# Patient Record
Sex: FEMALE | Race: ASIAN | NOT HISPANIC OR LATINO | Employment: FULL TIME | URBAN - METROPOLITAN AREA
[De-identification: names, ages, dates, MRNs, and addresses within clinical notes are randomized per-mention and may not be internally consistent; named-entity substitution may affect disease eponyms.]

---

## 2024-06-26 LAB
ABO (EXTERNAL): NORMAL
HEPATITIS B SURFACE ANTIGEN (EXTERNAL): NONREACTIVE
HEPATITIS C ANTIBODY (EXTERNAL): NONREACTIVE
HIV1+2 AB SERPL QL IA: NONREACTIVE
RH (EXTERNAL): POSITIVE
RUBELLA ANTIBODY IGG (EXTERNAL): NORMAL
TREPONEMA PALLIDUM ANTIBODY (EXTERNAL): NONREACTIVE

## 2024-08-23 LAB — GROUP B STREPTOCOCCUS (EXTERNAL): POSITIVE

## 2024-09-06 ENCOUNTER — TRANSFERRED RECORDS (OUTPATIENT)
Dept: HEALTH INFORMATION MANAGEMENT | Facility: CLINIC | Age: 39
End: 2024-09-06

## 2024-09-24 ENCOUNTER — HOSPITAL ENCOUNTER (INPATIENT)
Facility: HOSPITAL | Age: 39
LOS: 2 days | Discharge: HOME | End: 2024-09-26
Attending: ADVANCED PRACTICE MIDWIFE
Payer: COMMERCIAL

## 2024-09-24 PROBLEM — O47.9 UTERINE CONTRACTIONS: Status: ACTIVE | Noted: 2024-09-24

## 2024-09-24 PROBLEM — O99.820 GROUP B STREPTOCOCCUS CARRIER, ANTEPARTUM: Status: ACTIVE | Noted: 2024-09-24

## 2024-09-24 PROBLEM — Z36.89 ENCOUNTER FOR TRIAGE IN PREGNANT PATIENT: Status: ACTIVE | Noted: 2024-09-24

## 2024-09-24 LAB
ABO/RH(D): NORMAL
ANTIBODY SCREEN: NEGATIVE
HGB BLD-MCNC: 12 G/DL (ref 11.7–15.7)
HOLD SPECIMEN: NORMAL
SPECIMEN EXPIRATION DATE: NORMAL

## 2024-09-24 PROCEDURE — 36415 COLL VENOUS BLD VENIPUNCTURE: CPT

## 2024-09-24 PROCEDURE — 120N000001 HC R&B MED SURG/OB

## 2024-09-24 PROCEDURE — 85018 HEMOGLOBIN: CPT

## 2024-09-24 PROCEDURE — 86780 TREPONEMA PALLIDUM: CPT

## 2024-09-24 PROCEDURE — 250N000011 HC RX IP 250 OP 636

## 2024-09-24 PROCEDURE — 86900 BLOOD TYPING SEROLOGIC ABO: CPT

## 2024-09-24 RX ORDER — LIDOCAINE 40 MG/G
CREAM TOPICAL
Status: DISCONTINUED | OUTPATIENT
Start: 2024-09-24 | End: 2024-09-24 | Stop reason: HOSPADM

## 2024-09-24 RX ORDER — METOCLOPRAMIDE HYDROCHLORIDE 5 MG/ML
10 INJECTION INTRAMUSCULAR; INTRAVENOUS EVERY 6 HOURS PRN
Status: DISCONTINUED | OUTPATIENT
Start: 2024-09-24 | End: 2024-09-25 | Stop reason: HOSPADM

## 2024-09-24 RX ORDER — CITRIC ACID/SODIUM CITRATE 334-500MG
30 SOLUTION, ORAL ORAL
Status: DISCONTINUED | OUTPATIENT
Start: 2024-09-24 | End: 2024-09-25 | Stop reason: HOSPADM

## 2024-09-24 RX ORDER — OXYTOCIN 10 [USP'U]/ML
10 INJECTION, SOLUTION INTRAMUSCULAR; INTRAVENOUS
Status: DISCONTINUED | OUTPATIENT
Start: 2024-09-24 | End: 2024-09-25 | Stop reason: HOSPADM

## 2024-09-24 RX ORDER — KETOROLAC TROMETHAMINE 30 MG/ML
30 INJECTION, SOLUTION INTRAMUSCULAR; INTRAVENOUS
Status: COMPLETED | OUTPATIENT
Start: 2024-09-24 | End: 2024-09-25

## 2024-09-24 RX ORDER — METOCLOPRAMIDE 10 MG/1
10 TABLET ORAL EVERY 6 HOURS PRN
Status: DISCONTINUED | OUTPATIENT
Start: 2024-09-24 | End: 2024-09-25 | Stop reason: HOSPADM

## 2024-09-24 RX ORDER — OXYCODONE HYDROCHLORIDE 5 MG/1
5 TABLET ORAL
Status: DISCONTINUED | OUTPATIENT
Start: 2024-09-24 | End: 2024-09-26 | Stop reason: HOSPADM

## 2024-09-24 RX ORDER — NALOXONE HYDROCHLORIDE 0.4 MG/ML
0.2 INJECTION, SOLUTION INTRAMUSCULAR; INTRAVENOUS; SUBCUTANEOUS
Status: DISCONTINUED | OUTPATIENT
Start: 2024-09-24 | End: 2024-09-25 | Stop reason: HOSPADM

## 2024-09-24 RX ORDER — ACETAMINOPHEN 325 MG/1
650 TABLET ORAL EVERY 4 HOURS PRN
Status: DISCONTINUED | OUTPATIENT
Start: 2024-09-24 | End: 2024-09-25 | Stop reason: HOSPADM

## 2024-09-24 RX ORDER — HYDROXYZINE HYDROCHLORIDE 50 MG/1
50-100 TABLET, FILM COATED ORAL
Status: DISCONTINUED | OUTPATIENT
Start: 2024-09-24 | End: 2024-09-26 | Stop reason: HOSPADM

## 2024-09-24 RX ORDER — OXYTOCIN/0.9 % SODIUM CHLORIDE 30/500 ML
100-340 PLASTIC BAG, INJECTION (ML) INTRAVENOUS CONTINUOUS PRN
Status: DISCONTINUED | OUTPATIENT
Start: 2024-09-24 | End: 2024-09-26 | Stop reason: HOSPADM

## 2024-09-24 RX ORDER — LOPERAMIDE HCL 2 MG
2 CAPSULE ORAL
Status: DISCONTINUED | OUTPATIENT
Start: 2024-09-24 | End: 2024-09-25 | Stop reason: HOSPADM

## 2024-09-24 RX ORDER — MISOPROSTOL 200 UG/1
800 TABLET ORAL
Status: DISCONTINUED | OUTPATIENT
Start: 2024-09-24 | End: 2024-09-25 | Stop reason: HOSPADM

## 2024-09-24 RX ORDER — TRANEXAMIC ACID 10 MG/ML
1 INJECTION, SOLUTION INTRAVENOUS EVERY 30 MIN PRN
Status: DISCONTINUED | OUTPATIENT
Start: 2024-09-24 | End: 2024-09-25 | Stop reason: HOSPADM

## 2024-09-24 RX ORDER — MORPHINE SULFATE 10 MG/ML
10 INJECTION, SOLUTION INTRAMUSCULAR; INTRAVENOUS
Status: DISCONTINUED | OUTPATIENT
Start: 2024-09-24 | End: 2024-09-26 | Stop reason: HOSPADM

## 2024-09-24 RX ORDER — CALCIUM CARBONATE 500 MG/1
1000 TABLET, CHEWABLE ORAL 3 TIMES DAILY PRN
Status: DISCONTINUED | OUTPATIENT
Start: 2024-09-24 | End: 2024-09-25 | Stop reason: HOSPADM

## 2024-09-24 RX ORDER — ONDANSETRON 4 MG/1
4 TABLET, ORALLY DISINTEGRATING ORAL EVERY 6 HOURS PRN
Status: DISCONTINUED | OUTPATIENT
Start: 2024-09-24 | End: 2024-09-25 | Stop reason: HOSPADM

## 2024-09-24 RX ORDER — PENICILLIN G POTASSIUM 5000000 [IU]/1
5 INJECTION, POWDER, FOR SOLUTION INTRAMUSCULAR; INTRAVENOUS ONCE
Status: COMPLETED | OUTPATIENT
Start: 2024-09-24 | End: 2024-09-24

## 2024-09-24 RX ORDER — LOPERAMIDE HCL 2 MG
4 CAPSULE ORAL
Status: DISCONTINUED | OUTPATIENT
Start: 2024-09-24 | End: 2024-09-25 | Stop reason: HOSPADM

## 2024-09-24 RX ORDER — OXYTOCIN 10 [USP'U]/ML
10 INJECTION, SOLUTION INTRAMUSCULAR; INTRAVENOUS
Status: COMPLETED | OUTPATIENT
Start: 2024-09-24 | End: 2024-09-25

## 2024-09-24 RX ORDER — ONDANSETRON 2 MG/ML
4 INJECTION INTRAMUSCULAR; INTRAVENOUS EVERY 6 HOURS PRN
Status: DISCONTINUED | OUTPATIENT
Start: 2024-09-24 | End: 2024-09-25 | Stop reason: HOSPADM

## 2024-09-24 RX ORDER — MISOPROSTOL 200 UG/1
400 TABLET ORAL
Status: DISCONTINUED | OUTPATIENT
Start: 2024-09-24 | End: 2024-09-25 | Stop reason: HOSPADM

## 2024-09-24 RX ORDER — FERROUS SULFATE 325(65) MG
325 TABLET ORAL
COMMUNITY

## 2024-09-24 RX ORDER — CARBOPROST TROMETHAMINE 250 UG/ML
250 INJECTION, SOLUTION INTRAMUSCULAR
Status: DISCONTINUED | OUTPATIENT
Start: 2024-09-24 | End: 2024-09-25 | Stop reason: HOSPADM

## 2024-09-24 RX ORDER — METHYLERGONOVINE MALEATE 0.2 MG/ML
200 INJECTION INTRAVENOUS
Status: DISCONTINUED | OUTPATIENT
Start: 2024-09-24 | End: 2024-09-25 | Stop reason: HOSPADM

## 2024-09-24 RX ORDER — VITAMIN A, VITAMIN C, VITAMIN D-3, VITAMIN E, VITAMIN B-1, VITAMIN B-2, NIACIN, VITAMIN B-6, CALCIUM, IRON, ZINC, COPPER 4000; 120; 400; 22; 1.84; 3; 20; 10; 1; 12; 200; 27; 25; 2 [IU]/1; MG/1; [IU]/1; MG/1; MG/1; MG/1; MG/1; MG/1; MG/1; UG/1; MG/1; MG/1; MG/1; MG/1
1 TABLET ORAL DAILY
COMMUNITY

## 2024-09-24 RX ORDER — FENTANYL CITRATE 50 UG/ML
100 INJECTION, SOLUTION INTRAMUSCULAR; INTRAVENOUS
Status: DISCONTINUED | OUTPATIENT
Start: 2024-09-24 | End: 2024-09-25 | Stop reason: HOSPADM

## 2024-09-24 RX ORDER — NALOXONE HYDROCHLORIDE 0.4 MG/ML
0.4 INJECTION, SOLUTION INTRAMUSCULAR; INTRAVENOUS; SUBCUTANEOUS
Status: DISCONTINUED | OUTPATIENT
Start: 2024-09-24 | End: 2024-09-25 | Stop reason: HOSPADM

## 2024-09-24 RX ORDER — IBUPROFEN 800 MG/1
800 TABLET, FILM COATED ORAL
Status: COMPLETED | OUTPATIENT
Start: 2024-09-24 | End: 2024-09-25

## 2024-09-24 RX ORDER — SODIUM CHLORIDE, SODIUM LACTATE, POTASSIUM CHLORIDE, CALCIUM CHLORIDE 600; 310; 30; 20 MG/100ML; MG/100ML; MG/100ML; MG/100ML
INJECTION, SOLUTION INTRAVENOUS CONTINUOUS PRN
Status: DISCONTINUED | OUTPATIENT
Start: 2024-09-24 | End: 2024-09-25 | Stop reason: HOSPADM

## 2024-09-24 RX ORDER — PROCHLORPERAZINE MALEATE 10 MG
10 TABLET ORAL EVERY 6 HOURS PRN
Status: DISCONTINUED | OUTPATIENT
Start: 2024-09-24 | End: 2024-09-25 | Stop reason: HOSPADM

## 2024-09-24 RX ORDER — PENICILLIN G 3000000 [IU]/50ML
3 INJECTION, SOLUTION INTRAVENOUS EVERY 4 HOURS
Status: DISCONTINUED | OUTPATIENT
Start: 2024-09-24 | End: 2024-09-25 | Stop reason: HOSPADM

## 2024-09-24 RX ORDER — OXYTOCIN/0.9 % SODIUM CHLORIDE 30/500 ML
340 PLASTIC BAG, INJECTION (ML) INTRAVENOUS CONTINUOUS PRN
Status: DISCONTINUED | OUTPATIENT
Start: 2024-09-24 | End: 2024-09-25 | Stop reason: HOSPADM

## 2024-09-24 RX ADMIN — PENICILLIN G POTASSIUM 5 MILLION UNITS: 5000000 POWDER, FOR SOLUTION INTRAMUSCULAR; INTRAPLEURAL; INTRATHECAL; INTRAVENOUS at 20:01

## 2024-09-24 ASSESSMENT — ACTIVITIES OF DAILY LIVING (ADL)
ADLS_ACUITY_SCORE: 18
ADLS_ACUITY_SCORE: 35

## 2024-09-24 NOTE — PROGRESS NOTES
Pt sitting up to regular meal. CNM orders that Efm may be removed.  Plan of care explained to pt and spouse.   Karen J Schoenberg, RN

## 2024-09-24 NOTE — PROGRESS NOTES
Pt states contractions becoming more regular since vaginal exam by RN. Pt orders meal. Plan to ambulate after meal and initate Saline lock and IV antibiotics if labor pattern continues.     CNM on [facility] updated of pt current contractions pattern.     Pt is cheerful and talks through currently mild palpable contractions.   Karen J Schoenberg, RN

## 2024-09-24 NOTE — PROGRESS NOTES
" presents with statements that she has had occasional contractions today and \"I hope today is the day\". Pt denies bloody show or leakage of any fluid, and states she didn't time any contractions because \"I was too busy with my 2 yr old.\".    Pt is accompanies by FOB.     Pt denies any health issues in pregnancy or otherwise.     Placed on EFM and PO fluids given. Triage plan of care explained.     Abdomen palpates soft. No contractions noted when RN in room yet.     Karen J Schoenberg, RN      "

## 2024-09-25 LAB — T PALLIDUM AB SER QL: NONREACTIVE

## 2024-09-25 PROCEDURE — 250N000013 HC RX MED GY IP 250 OP 250 PS 637: Performed by: REGISTERED NURSE

## 2024-09-25 PROCEDURE — 722N000001 HC LABOR CARE VAGINAL DELIVERY SINGLE

## 2024-09-25 PROCEDURE — 250N000013 HC RX MED GY IP 250 OP 250 PS 637

## 2024-09-25 PROCEDURE — 10907ZC DRAINAGE OF AMNIOTIC FLUID, THERAPEUTIC FROM PRODUCTS OF CONCEPTION, VIA NATURAL OR ARTIFICIAL OPENING: ICD-10-PCS | Performed by: REGISTERED NURSE

## 2024-09-25 PROCEDURE — 0HQ9XZZ REPAIR PERINEUM SKIN, EXTERNAL APPROACH: ICD-10-PCS | Performed by: REGISTERED NURSE

## 2024-09-25 PROCEDURE — 250N000009 HC RX 250

## 2024-09-25 PROCEDURE — 120N000001 HC R&B MED SURG/OB

## 2024-09-25 PROCEDURE — 250N000011 HC RX IP 250 OP 636

## 2024-09-25 RX ORDER — LOPERAMIDE HCL 2 MG
4 CAPSULE ORAL
Status: DISCONTINUED | OUTPATIENT
Start: 2024-09-25 | End: 2024-09-26 | Stop reason: HOSPADM

## 2024-09-25 RX ORDER — NALOXONE HYDROCHLORIDE 0.4 MG/ML
0.2 INJECTION, SOLUTION INTRAMUSCULAR; INTRAVENOUS; SUBCUTANEOUS
Status: DISCONTINUED | OUTPATIENT
Start: 2024-09-25 | End: 2024-09-26 | Stop reason: HOSPADM

## 2024-09-25 RX ORDER — BISACODYL 10 MG
10 SUPPOSITORY, RECTAL RECTAL DAILY PRN
Status: DISCONTINUED | OUTPATIENT
Start: 2024-09-25 | End: 2024-09-26 | Stop reason: HOSPADM

## 2024-09-25 RX ORDER — OXYCODONE HYDROCHLORIDE 5 MG/1
5 TABLET ORAL EVERY 4 HOURS PRN
Status: DISCONTINUED | OUTPATIENT
Start: 2024-09-25 | End: 2024-09-26 | Stop reason: HOSPADM

## 2024-09-25 RX ORDER — NALOXONE HYDROCHLORIDE 0.4 MG/ML
0.4 INJECTION, SOLUTION INTRAMUSCULAR; INTRAVENOUS; SUBCUTANEOUS
Status: DISCONTINUED | OUTPATIENT
Start: 2024-09-25 | End: 2024-09-26 | Stop reason: HOSPADM

## 2024-09-25 RX ORDER — LOPERAMIDE HCL 2 MG
2 CAPSULE ORAL
Status: DISCONTINUED | OUTPATIENT
Start: 2024-09-25 | End: 2024-09-26 | Stop reason: HOSPADM

## 2024-09-25 RX ORDER — MISOPROSTOL 200 UG/1
400 TABLET ORAL
Status: DISCONTINUED | OUTPATIENT
Start: 2024-09-25 | End: 2024-09-26 | Stop reason: HOSPADM

## 2024-09-25 RX ORDER — CARBOPROST TROMETHAMINE 250 UG/ML
250 INJECTION, SOLUTION INTRAMUSCULAR
Status: DISCONTINUED | OUTPATIENT
Start: 2024-09-25 | End: 2024-09-26 | Stop reason: HOSPADM

## 2024-09-25 RX ORDER — DOCUSATE SODIUM 100 MG/1
100 CAPSULE, LIQUID FILLED ORAL DAILY
Status: DISCONTINUED | OUTPATIENT
Start: 2024-09-25 | End: 2024-09-26 | Stop reason: HOSPADM

## 2024-09-25 RX ORDER — TERBUTALINE SULFATE 1 MG/ML
0.25 INJECTION, SOLUTION SUBCUTANEOUS
Status: DISCONTINUED | OUTPATIENT
Start: 2024-09-25 | End: 2024-09-25 | Stop reason: HOSPADM

## 2024-09-25 RX ORDER — MISOPROSTOL 200 UG/1
800 TABLET ORAL
Status: DISCONTINUED | OUTPATIENT
Start: 2024-09-25 | End: 2024-09-26 | Stop reason: HOSPADM

## 2024-09-25 RX ORDER — ACETAMINOPHEN 325 MG/1
650 TABLET ORAL EVERY 4 HOURS PRN
Status: DISCONTINUED | OUTPATIENT
Start: 2024-09-25 | End: 2024-09-26 | Stop reason: HOSPADM

## 2024-09-25 RX ORDER — METHYLERGONOVINE MALEATE 0.2 MG/ML
200 INJECTION INTRAVENOUS
Status: DISCONTINUED | OUTPATIENT
Start: 2024-09-25 | End: 2024-09-26 | Stop reason: HOSPADM

## 2024-09-25 RX ORDER — OXYTOCIN 10 [USP'U]/ML
10 INJECTION, SOLUTION INTRAMUSCULAR; INTRAVENOUS
Status: DISCONTINUED | OUTPATIENT
Start: 2024-09-25 | End: 2024-09-26 | Stop reason: HOSPADM

## 2024-09-25 RX ORDER — OXYTOCIN/0.9 % SODIUM CHLORIDE 30/500 ML
340 PLASTIC BAG, INJECTION (ML) INTRAVENOUS CONTINUOUS PRN
Status: DISCONTINUED | OUTPATIENT
Start: 2024-09-25 | End: 2024-09-26 | Stop reason: HOSPADM

## 2024-09-25 RX ORDER — MODIFIED LANOLIN
OINTMENT (GRAM) TOPICAL
Status: DISCONTINUED | OUTPATIENT
Start: 2024-09-25 | End: 2024-09-26 | Stop reason: HOSPADM

## 2024-09-25 RX ORDER — OXYTOCIN/0.9 % SODIUM CHLORIDE 30/500 ML
1-24 PLASTIC BAG, INJECTION (ML) INTRAVENOUS CONTINUOUS
Status: DISCONTINUED | OUTPATIENT
Start: 2024-09-25 | End: 2024-09-25 | Stop reason: HOSPADM

## 2024-09-25 RX ORDER — HYDROCORTISONE 25 MG/G
CREAM TOPICAL 3 TIMES DAILY PRN
Status: DISCONTINUED | OUTPATIENT
Start: 2024-09-25 | End: 2024-09-26 | Stop reason: HOSPADM

## 2024-09-25 RX ORDER — TRANEXAMIC ACID 10 MG/ML
1 INJECTION, SOLUTION INTRAVENOUS EVERY 30 MIN PRN
Status: DISCONTINUED | OUTPATIENT
Start: 2024-09-25 | End: 2024-09-26 | Stop reason: HOSPADM

## 2024-09-25 RX ORDER — IBUPROFEN 800 MG/1
800 TABLET, FILM COATED ORAL EVERY 6 HOURS PRN
Status: DISCONTINUED | OUTPATIENT
Start: 2024-09-25 | End: 2024-09-26 | Stop reason: HOSPADM

## 2024-09-25 RX ADMIN — IBUPROFEN 800 MG: 800 TABLET ORAL at 17:24

## 2024-09-25 RX ADMIN — PENICILLIN G 3 MILLION UNITS: 3000000 INJECTION, SOLUTION INTRAVENOUS at 04:52

## 2024-09-25 RX ADMIN — PENICILLIN G 3 MILLION UNITS: 3000000 INJECTION, SOLUTION INTRAVENOUS at 00:49

## 2024-09-25 RX ADMIN — IBUPROFEN 800 MG: 800 TABLET ORAL at 10:52

## 2024-09-25 RX ADMIN — ACETAMINOPHEN 650 MG: 325 TABLET ORAL at 10:52

## 2024-09-25 RX ADMIN — DOCUSATE SODIUM 100 MG: 100 CAPSULE, LIQUID FILLED ORAL at 11:36

## 2024-09-25 RX ADMIN — Medication 2 MILLI-UNITS/MIN: at 07:41

## 2024-09-25 RX ADMIN — LIDOCAINE HYDROCHLORIDE 20 ML: 10 INJECTION, SOLUTION EPIDURAL; INFILTRATION; INTRACAUDAL; PERINEURAL at 10:27

## 2024-09-25 RX ADMIN — METHYLERGONOVINE MALEATE 200 MCG: 0.2 INJECTION INTRAVENOUS at 10:35

## 2024-09-25 RX ADMIN — PENICILLIN G 3 MILLION UNITS: 3000000 INJECTION, SOLUTION INTRAVENOUS at 08:34

## 2024-09-25 RX ADMIN — ACETAMINOPHEN 650 MG: 325 TABLET ORAL at 20:02

## 2024-09-25 RX ADMIN — OXYTOCIN 10 UNITS: 10 INJECTION, SOLUTION INTRAMUSCULAR; INTRAVENOUS at 10:20

## 2024-09-25 RX ADMIN — ACETAMINOPHEN 650 MG: 325 TABLET ORAL at 15:17

## 2024-09-25 ASSESSMENT — ACTIVITIES OF DAILY LIVING (ADL)
ADLS_ACUITY_SCORE: 18

## 2024-09-25 NOTE — PROGRESS NOTES
"Patient Name:  Charla Cintron  :      1985  MRN:      [id number]    Subjective  Charla Cintron is coping well with contractions. Using non pharmacologic  methods for pain management. Reports \"contractions are stronger, but I feel like they need to be closer together.\"     Objective  /66 (BP Location: Left arm, Patient Position: Standing, Cuff Size: Adult Regular)   Temp 97.9  F (36.6  C) (Oral)   Resp 18   Ht 1.549 m (5' 1\")   Wt 78.5 kg (173 lb)   SpO2 95%   BMI 32.69 kg/m      Placed on EFM- FHR has been assessed by IA throughout night. Initially early vs. late decelerations x 3 noted (toco not working and was replaced), after which FHR category 1 with accelerations  FHT: baseline 125, moderate variability, + accelerations, no decelerations  Uterine contractions: moderate to strong contractions every 2.5-6 min lasting  sec    SVE: not performed    Assessment  40 yo  at 41w0d  Augmentation of labor with Pitocin   GBS positive- s/p 3 doses PCN  Category 1 FHTs      Plan  -Desires augmentation- discussed risks and benefits of AROM vs. Pitocin- she and Herve wanted to discuss for a few minutes. Ultimately they decided to opt for Pitocin. Orders placed.   -Routine support & management. Encourage position changes, rest as desired.  -Anticipate progress and NSVB.   -AMTSL with delayed cord clamping as allowed by  status  -Re-evaluate progress as warranted by maternal or fetal status     Dr. Burgess remains available for consultation and collaboration as needed.    DARYA Harry, ERICA    Date:  2024  Time:  7:10AM    "

## 2024-09-25 NOTE — L&D DELIVERY NOTE
Vaginal Delivery Note    Name: Charla Cintron  : 1985  MRN: [id number]    PRE DELIVERY DIAGNOSIS  39 year old  2 Para 1001 at 41w0d      JANET from [facility] at 24 weeks  AMA (37)  Varicella non-immune   GBS positive       POST DELIVERY DIAGNOSIS  39 year old  @ 41w0d  Delivery of a viable infant weight pending   via     YOB: 2022     Birth Time: 10:18 AM       Augmentation Yes              Indication: ineffective contraction pattern  Induction No                      Indication:     Monitors: External     GBS: Positive, 4 doses penicillin completed    ROM: AROM  Fluid Type: Clear    Labor Analgesia/Anesthesia:Nitrous oxide    Cord pH obtained: No  Placenta Date/Time: 2024 10:28 AM   Placenta submitted to Pathology: No    Description of procedure:   39 year old  with PNC w/ MWC and pregnancy complicated by AMA presented to L&D with labor contractions in prodromal early labor.  Her hospital course was uncomplicated.  Patient progressed to complete with artificial rupture of membranes and pitocin.  evening Charla arrived in early prodromal labor and ultimately consented to pitocin augmentation around  after minimal cervical charge. She quickly became more uncomfortable with contractions. Requested AROM at 7-8cm around 930. After sitting on the toilet and utilizing different positions as desired she felt a spontaneous urge to push.  Charla experienced rapid fetal descent and over the course of only 3 contractions brought her baby to quick but controlled crown.  The anterior shoulder delivered easily with gentle downward traction paired with maternal efforts. Charla Cintron and Herve welcomed their daughter, Cecelia Zavala.   Shoulder Dystocia No  Operative Vaginal Delivery No  Infant spontaneously delivered over an  1st degree laceration.   It was repaired in the normal fashion using local anesthesia using 3-0  vicryl.  Infant delivered in OMAIRA position.  Nuchal cord No     Placenta spontaneously delivered: 9/25/2024 10:28 AM  grossly intact with 3 vessel cord.  Infant:  Live, vigorous infant was handed to mom.    Delivery was complicated by  with uterine atony requiring IM pitocin and methergine. IV inadvertently removed with patient movement at delivery. Interventions included fundal massage, pitocin, and methergine.    Delivery QBL (mL): 900    Mother and Infant stable.    ERICA Gardner Dr. remained available for consultation and collaboration as needed.      9/25/2024 10:47 AM

## 2024-09-25 NOTE — PROGRESS NOTES
"Patient Name:  Charla Cintron  :      1985  MRN:      [id number]    Subjective  Charla Cintron is coping well with mild to moderate contractions. Are a little more intense when she is ambulating, intensity decreases while resting. She is not interested in going home. Requesting membrane sweep at this time.     Objective  /59 (BP Location: Left arm, Patient Position: Semi-Michelle's, Cuff Size: Adult Regular)   Temp 98  F (36.7  C) (Oral)   Resp 18   Ht 1.549 m (5' 1\")   Wt 78.5 kg (173 lb)   SpO2 97%   BMI 32.69 kg/m      FHT: intermittent auscultation, WNL per RN  Uterine contractions: mild to moderate contractions every 5 min     SVE: 3.5/50/-2 prior to sweep, 4.5/50/-2 following sweep. BBOW with contraction.     Assessment  38 yo  at 41w0d  Early labor  Intact membranes  GBS positive- s/p 2 doses PCN     Plan  -R/B/A of membrane sweep discussed with Charla and Herve, main risks of discomfort/pain, accidental ROM, and unsuccessful stimulation of labor. They desire to proceed with membrane sweep. Discussed that if no/minimal change in intensity and frequency of contractions over next 1-2 hrs, recommend proceeding with AROM.  -Routine support & management. Encourage position changes, rest as desired.  -Anticipate progress and NSVB.   -Reevaluate progress when consistently more uncomfortable with contractions, desire for intervention, or fetal indications.   -AMTSL with delayed cord clamping as allowed by  status    Dr. Burgess remains available for consultation and collaboration as needed.    DARYA Harry, ERICA    Date:  2024  Time:  2:21 AM    "

## 2024-09-25 NOTE — H&P
"HISTORY AND PHYSICAL UPDATE ADMISSION EXAM    Name: Charla Cintron  YOB: 1985  Medical Record Number: [id number]    History of Present Illness: Charla Cintron is a 39 year old  who is 40w6d pregnant and being admitted for labor management. She reports regular, painful contractions started around 1400. Denies LOF. Charla is originally from [facility], but had her first baby in [facility] and initiated prenatal care there for this pregnancy. She moved to [place] at 24 weeks- she received early and consistent prenatal care throughout her pregnancy. She is hoping for a low-intervention birth, and is planning to utilize non-pharmacologic methods for pain management. She is supported by her  Herve.     Last growth US on 24 at 40w2d: EFW 80.7% (3894g/8#9oz) MANDI 15.18.    Blood Type: B pos  GBS: Positive  GCT: passed (112)  Tdap 24  NIPS: low risk, XX (had this done in [facility])    Estimated Date of Delivery: Sep 18, 2024  EGA 40w6d    OB History    Para Term  AB Living   2 1 1 0 0 1   SAB IAB Ectopic Multiple Live Births   0 0 0 0 1      # Outcome Date GA Lbr Evelio/2nd Weight Sex Type Anes PTL Lv   2 Current            1 Term         JOSELUIS            Prenatal Complications:   JANET from [facility] at 24 weeks  AMA (39)  Varicella non-immune   GBS positive     Exam:      /72   Temp 98  F (36.7  C) (Oral)   Resp 18   Ht 1.549 m (5' 1\")   Wt 78.5 kg (173 lb)   BMI 32.69 kg/m      Fetal Heart Rate: Reactive NST- baseline 120, moderate variability, + accelerations, no decelerations  Contractions: moderate contractions every 2.5-6 min.    HEENT grossly normal  Lungs: respirations regular and unlabored  ABD gravid, non-tender  EXT:  no edema, moves freely  Vaginal exam 3.5-4/50/-2 per triage RN  Membranes: intact    Assessment:   40yo  at 40w6d in early labor  GBS positive  Category 1 tracing    Plan:   Admit - see IP orders  Initiate " intermittent auscultation  Start PCN for GBS prophylaxis   Encourage position changes, ambulation, and rest as desired  Planning unmedicated labor- orders placed for all pharmacologic methods if desired.   Reviewed options for augmentation of labor with Pitocin or AROM after adequate GBS prophylaxis achieved- at this time is hoping to progress spontaneously.  Anticipate progress and   AMTSL with delayed cord clamping as allowed by  status  Re-evaluate 4 hrs after abx, sooner as indicated.     Prenatal record reviewed.  Dr. Burgess is aware of patient admission and remains available for consultation and collaboration as needed.    DARYA Maharaj CNM  2024   7:14 PM

## 2024-09-25 NOTE — PLAN OF CARE
Problem: Labor  Goal: Hemostasis  Outcome: Adequate for Care Transition  Intervention: Manage Bleeding  Recent Flowsheet Documentation  Taken 2024 by Reynaldo Murphy RN   Bleed Management: Rh status confirmed  Goal: Stable Fetal Wellbeing  Outcome: Adequate for Care Transition  Intervention: Promote and Monitor Fetal Wellbeing  Recent Flowsheet Documentation  Taken 2024 by Reynaldo Murphy RN  Fetal Wellbeing Promotion:   fetal heart rate monitored   fetal heart tones checked   uterine contraction activity assessed   obstetric care provider contacted   maternal position adjusted  Goal: Effective Progression to Delivery  Outcome: Adequate for Care Transition  Goal: Absence of Infection Signs and Symptoms  Outcome: Adequate for Care Transition  Goal: Acceptable Pain Control  Outcome: Adequate for Care Transition  Intervention: Support Labor Pain Coping and Management  Recent Flowsheet Documentation  Taken 2024 by Reynaldo Murphy RN  Sensory Stimulation Regulation:   quiet environment promoted   lighting decreased  Complementary Therapy: aromatherapy utilized  Goal: Normal Uterine Contraction Pattern  Outcome: Adequate for Care Transition  Intervention: Monitor and Manage Uterine Activity  Recent Flowsheet Documentation  Taken 2024 by Reynaldo Murphy RN  Fluid/Electrolyte Management: fluids provided   Goal Outcome Evaluation:      Plan of Care Reviewed With: patient, spouse    Overall Patient Progress: improvingOverall Patient Progress: improving    Outcome Evaluation: AROM with clear fluid. SNVD with ERICA Turpin with a viable baby girl at 1018. nitrous oxide used for pain management. 1st degree perineal laceration, repaired. QBL of 900ml, methergine and pitocin IM given, total with recovery blood loss is 1032ml. Uterus boggy and firm with massages, light to moderate bleeding with some clots. up to bathroom and attempted to void, bladder scan of 139ml, will attempt  again in 1 HR, patient taking PO fluid and solid without nausea. adequatey treated for positive GBS, 4 dosages given. PIV accidently dislodged. supportive and attentive . good appetite with lunch.

## 2024-09-25 NOTE — PROGRESS NOTES
Pt reporting mod contractions Q3-5 min apart. States she is coping. Palpate mild/mod. IA hourly. Pt utilizing birthing ball, ambulating, and standing/swaying. Offered sleep meds, pt declined. Offered to set up breast pump, pt declined at this time. Will continue to monitor/intervene as needed.

## 2024-09-25 NOTE — PROGRESS NOTES
Nitrous oxide started at 0852. Patient aware of benefits of usage. Patient demonstrated proper usage.  at bedside, supportive and attentive. Some relief with reassessment, per patient will continue to use.    Reynaldo Murphy RN

## 2024-09-25 NOTE — PROGRESS NOTES
"Patient Name:  Charla Cintron  :      1985  MRN:      [id number]    Subjective  Charla Cintron is coping well with mild to moderate contractions. Using non-pharmacologic methods for pain management. Good PO fluid intake. Voiding without issue. Family supportive at bedside.     Objective  /72   Temp 98  F (36.7  C) (Oral)   Resp 18   Ht 1.549 m (5' 1\")   Wt 78.5 kg (173 lb)   BMI 32.69 kg/m      FHT: intermittent auscultation by RN- reports WNL  Uterine contractions: mild to moderate contractions every 3-6 min     SVE: deferred    Assessment  38 yo  at 40w6d  Latent labor  GBS positive- s/p PCN x 1  Intact membranes    Plan  -Routine support & management. Encourage position changes, rest as desired.  -Feeling tired and wanting to try to rest, however she voices concern about contractions spacing/stopping. Reviewed that if contractions space, there are many options to increase contraction frequency and intensity, including membrane sweep, AROM, utilizing breast pump, or Pitocin. We also discussed that if her contractions stop and she would rather go home, that could also be an option if dilation not advanced.   -Anticipate progress and NSVB.   -Reevaluate progress when consistently more uncomfortable with contractions, desire for intervention, or fetal indications.     Dr. Burgess remains available for consultation and collaboration as needed.    DARYA Harry CNM    Date:  2024  Time:  10:12 PM    "

## 2024-09-26 VITALS
WEIGHT: 164.7 LBS | HEIGHT: 61 IN | SYSTOLIC BLOOD PRESSURE: 106 MMHG | OXYGEN SATURATION: 98 % | BODY MASS INDEX: 31.1 KG/M2 | DIASTOLIC BLOOD PRESSURE: 64 MMHG | HEART RATE: 78 BPM | RESPIRATION RATE: 18 BRPM | TEMPERATURE: 97.7 F

## 2024-09-26 PROBLEM — Z36.89 ENCOUNTER FOR TRIAGE IN PREGNANT PATIENT: Status: RESOLVED | Noted: 2024-09-24 | Resolved: 2024-09-26

## 2024-09-26 PROBLEM — O99.820 GROUP B STREPTOCOCCUS CARRIER, ANTEPARTUM: Status: RESOLVED | Noted: 2024-09-24 | Resolved: 2024-09-26

## 2024-09-26 PROBLEM — O47.9 UTERINE CONTRACTIONS: Status: RESOLVED | Noted: 2024-09-24 | Resolved: 2024-09-26

## 2024-09-26 LAB — HGB BLD-MCNC: 10.3 G/DL (ref 11.7–15.7)

## 2024-09-26 PROCEDURE — 250N000011 HC RX IP 250 OP 636: Performed by: REGISTERED NURSE

## 2024-09-26 PROCEDURE — 36415 COLL VENOUS BLD VENIPUNCTURE: CPT | Performed by: REGISTERED NURSE

## 2024-09-26 PROCEDURE — 90656 IIV3 VACC NO PRSV 0.5 ML IM: CPT | Performed by: REGISTERED NURSE

## 2024-09-26 PROCEDURE — 250N000013 HC RX MED GY IP 250 OP 250 PS 637: Performed by: REGISTERED NURSE

## 2024-09-26 PROCEDURE — G0008 ADMIN INFLUENZA VIRUS VAC: HCPCS | Performed by: REGISTERED NURSE

## 2024-09-26 PROCEDURE — 85018 HEMOGLOBIN: CPT | Performed by: REGISTERED NURSE

## 2024-09-26 RX ORDER — ACETAMINOPHEN 325 MG/1
650 TABLET ORAL EVERY 4 HOURS PRN
Qty: 90 TABLET | Refills: 0 | Status: SHIPPED | OUTPATIENT
Start: 2024-09-26

## 2024-09-26 RX ORDER — DOCUSATE SODIUM 100 MG/1
100 CAPSULE, LIQUID FILLED ORAL DAILY
Qty: 90 CAPSULE | Refills: 0 | Status: SHIPPED | OUTPATIENT
Start: 2024-09-27

## 2024-09-26 RX ORDER — IBUPROFEN 800 MG/1
800 TABLET, FILM COATED ORAL EVERY 6 HOURS PRN
Qty: 30 TABLET | Refills: 0 | Status: SHIPPED | OUTPATIENT
Start: 2024-09-26

## 2024-09-26 RX ADMIN — INFLUENZA A VIRUS A/VICTORIA/4897/2022 IVR-238 (H1N1) ANTIGEN (FORMALDEHYDE INACTIVATED), INFLUENZA A VIRUS A/CALIFORNIA/122/2022 SAN-022 (H3N2) ANTIGEN (FORMALDEHYDE INACTIVATED), AND INFLUENZA B VIRUS B/MICHIGAN/01/2021 ANTIGEN (FORMALDEHYDE INACTIVATED) 0.5 ML: 15; 15; 15 INJECTION, SUSPENSION INTRAMUSCULAR at 10:28

## 2024-09-26 RX ADMIN — DOCUSATE SODIUM 100 MG: 100 CAPSULE, LIQUID FILLED ORAL at 08:56

## 2024-09-26 RX ADMIN — IBUPROFEN 800 MG: 800 TABLET ORAL at 04:15

## 2024-09-26 RX ADMIN — IBUPROFEN 800 MG: 800 TABLET ORAL at 10:30

## 2024-09-26 ASSESSMENT — ACTIVITIES OF DAILY LIVING (ADL)
ADLS_ACUITY_SCORE: 18

## 2024-09-26 NOTE — PLAN OF CARE
Goal Outcome Evaluation:      Plan of Care Reviewed With: patient, spouse    Overall Patient Progress: improvingOverall Patient Progress: improving    Outcome Evaluation: VSS, afebrile. Patient is ambulating independently and voiding spontaneously without difficulty. Patient acceptable to PRN pain medications available. Scant bleeding present on peripad without clots expressed. Patient is attentive to infant cues, breastfeeding independently, and performing infant cares.      Problem: Breastfeeding  Goal: Effective Breastfeeding  Outcome: Progressing     Problem: Postpartum (Vaginal Delivery)  Goal: Successful Parent Role Transition  Outcome: Progressing  Goal: Hemostasis  Outcome: Progressing  Goal: Absence of Infection Signs and Symptoms  Outcome: Progressing  Goal: Optimal Pain Control and Function  Outcome: Progressing  Goal: Effective Urinary Elimination  Outcome: Progressing

## 2024-09-26 NOTE — DISCHARGE INSTRUCTIONS
Warning Signs after Having a Baby    Keep this paper on your fridge or somewhere else where you can see it.    Call your provider if you have any of these symptoms up to 12 weeks after having your baby.    Thoughts of hurting yourself or your baby  Pain in your chest or trouble breathing  Severe headache not helped by pain medicine  Eyesight concerns (blurry vision, seeing spots or flashes of light, other changes to eyesight)  Fainting, shaking or other signs of a seizure    Call 9-1-1 if you feel that it is an emergency.     The symptoms below can happen to anyone after giving birth. They can be very serious. Call your provider if you have any of these warning signs.    My provider s phone number: _______________________    Losing too much blood (hemorrhage)    Call your provider if you soak through a pad in less than an hour or pass blood clots bigger than a golf ball. These may be signs that you are bleeding too much.    Blood clots in the legs or lungs    After you give birth, your body naturally clots its blood to help prevent blood loss. Sometimes this increased clotting can happen in other areas of the body, like the legs or lungs. This can block your blood flow and be very dangerous.     Call your provider if you:  Have a red, swollen spot on the back of your leg that is warm or painful when you touch it.   Are coughing up blood.     Infection    Call your provider if you have any of these symptoms:  Fever of 100.4 F (38 C) or higher.  Pain or redness around your stitches if you had an incision.   Any yellow, white, or green fluid coming from places where you had stitches or surgery.    Mood Problems (postpartum depression)    Many people feel sad or have mood changes after having a baby. But for some people, these mood swings are worse.     Call your provider right away if you feel so anxious or nervous that you can't care for yourself or your baby.    Preeclampsia (high blood pressure)    Even if you  didn't have high blood pressure when you were pregnant, you are at risk for the high blood pressure disease called preeclampsia. This risk can last up to 12 weeks after giving birth.     Call your provider if you have:   Pain on your right side under your rib cage  Sudden swelling in the hands and face    Remember: You know your body. If something doesn't feel right, get medical help.     For informational purposes only. Not to replace the advice of your health care provider. Copyright 2020 [facility]. All rights reserved. Clinically reviewed by Leah Zuluaga, RNC-OB, MSN. [email or web address] [id number] - Rev 02/23.

## 2024-09-26 NOTE — DISCHARGE SUMMARY
OB Discharge Summary      Date:  2024    Name:  Charla Cintron  :  1985  MRN:  [id number]      Admission Date:  2024  Delivery Date: 2024   Gestational Age at Delivery:  41w0d  Discharge Date:  2024    Principal Diagnosis:    Patient Active Problem List   Diagnosis     (normal spontaneous vaginal delivery)         Conditions complicating Pregnancy:   JANET from [facility] at 24 weeks  AMA (37)  Varicella non-immune   GBS positive     Procedure(s) Performed:  , 1st deg laceration repair    Indication for :  N/A  Indication for Induction:  N/A- augmented early labor     Condition at Discharge:  well    Discharge Medications:      Review of your medicines        START taking        Dose / Directions   acetaminophen 325 MG tablet  Commonly known as: TYLENOL      Dose: 650 mg  Take 2 tablets (650 mg) by mouth every 4 hours as needed for mild pain.  Quantity: 90 tablet  Refills: 0     docusate sodium 100 MG capsule  Commonly known as: COLACE      Dose: 100 mg  Start taking on: 2024  Take 1 capsule (100 mg) by mouth daily.  Quantity: 90 capsule  Refills: 0     ibuprofen 800 MG tablet  Commonly known as: ADVIL/MOTRIN      Dose: 800 mg  Take 1 tablet (800 mg) by mouth every 6 hours as needed for other (cramping).  Quantity: 30 tablet  Refills: 0            CONTINUE these medicines which have NOT CHANGED        Dose / Directions   ferrous sulfate 325 (65 Fe) MG tablet  Commonly known as: FEROSUL      Dose: 325 mg  Take 325 mg by mouth daily (with breakfast).  Refills: 0     prenatal multivitamin  plus iron 27-1 MG Tabs  Indication: Pregnancy      Dose: 1 tablet  Take 1 tablet by mouth daily.  Refills: 0               Where to get your medicines        These medications were sent to [facility] [address] [address]      Phone: [phone]   acetaminophen 325  MG tablet  docusate sodium 100 MG capsule  ibuprofen 800 MG tablet          Discharge Plan:    Follow up with /CNM:  6 weeks postpartum, sooner with concerns   Patient Instructions:      Physical activity: As tolerated. Nothing in the vagina for 6 weeks.    Diet:  Regular    Medication: As listed above. May alternate ibuprofen and acetaminophen for pain management.    Other:        Physician/CNM: DARYA Rubio CNM    Name:  Charla Cintron  :  1985  MRN:  [id number]

## 2024-09-26 NOTE — LACTATION NOTE
Initial Lactation Visit    Hours since delivery: 25 hours old    Gestational age at delivery: 41w0d     Diaper count: 1 wet 2 soiled meconium color     Feedings: 13 breast  0 supplement      Weight Loss: 4.5% at 24 hours    Breastfeeding goals:6-12 months    Past breastfeeding experience: Had a hard time at first with first child who is now 3yo with latching. Infant did have to be seen in clinic for dehydration parents explained.     Maternal health risk that may affect breastfeeding:AMA, QBL >1000    Home Pump: Lactation distributed mother a breast pump, Spectra, from [facility].  Mother was instructed on the use and cleaning of the breast pump.  Mother verbalizes understanding of how to use the breast pump.  She was instructed to empty her breasts 8-12 times in 24 hours, either with the baby at the breast or the breast pump, to have optimal milk production for her .       Feeding assessment: No feeding observed today. Mother explained that feedings are going well and latch is comfortable. Reviewed engorgement and reverse pressure softening along with diaper counts and feeding patterns. No further questions today    Feeding plan: Breastfeeding Plan:     Offer breast every 2-3 hours.   Massage breast to encourage milk flow   Strive for a deep and comfortable latch  Positioning reminders:  line up baby's nose to nipple   baby's chin touching the breast below the areola  ear, shoulder, hip, nice straight line   chin off chest  your thumb lined up like baby's mustache, fingers under breast like a baby's beard  cheeks touching breast  Switch sides when swallows slow, baby pauses lengthen and compressions do not help    Overall goals for baby:    Feed well at breast 8 or more times per day, 15 minutes of active swallowing over 20-40 minutes at breast  Lose no more than 8-10% of birthweight in the first 3 days  Meet goals for wet and soiled diapers (per Postpartum & [place] Care booklet)  Gain  back to birthweight in 10-14 days    If above goals are not met pump 15-20 minutes to stimulate and collect breastmilk.     Feed expressed milk to baby using the amounts below as a guideline. Give more as baby cues. If necessary, make up difference with donor milk or formula as a bridge until milk supply increases.      0-24 hours is 2-10 ml per feeding   24-48 hours  is 5-15ml per feeding   48-72 hours is 15-30ml per feeding   72-96 hours is 30-60ml per feeding   96 hours and older follow healthcare providers recommendation     Education:   [x] Expected  feeding patterns in the first few days (pg. 38 of Your Guide to To Postpartum and  Care)/ the Second Night  [x] Stages of milk production  [] Hand expression   [] Feeding cues     [] Benefits of skin to skin  [] Breastfeeding positions  [x] Tips to get and maintain a deep latch  [] Usage of nipple shield  [x] Nutritive vs.non-nutritive sucking  [x] Gentle breast compressions as needed  [] How to tell when baby is finished  [] Supplementation  [] Paced bottle feeding  [] Spoon/cup feeding  [] LPI feeding patterns  [] LBW feeding patterns  [] Expected  output  [] Holcomb weight loss  [] Infant Feeding Log  [] Calming techniques  [x] Engorgement/Reverse Pressure Softening  [x] Pumping  [x] Breastpump education  [x] Inpatient breastfeeding support  [] Outpatient lactation resources    Follow up: Plan to discharge today, .

## 2024-09-26 NOTE — PLAN OF CARE
Problem: Adult Inpatient Plan of Care  Goal: Optimal Comfort and Wellbeing  Outcome: Progressing   Goal Outcome Evaluation:Pt following normal involutional process, FF@U2 with scant to small flow. Client denies any clots at this time and declines ice for perineum. Voiding without difficulty, no BM at this time. VS and assessments WNL. Bonding well with baby girl and client states breastfeeding is going well. Client states pain is controled well with medication administered. No concerns at this time.

## 2024-09-26 NOTE — PLAN OF CARE
"  Problem: Adult Inpatient Plan of Care  Goal: Plan of Care Review  Description: The Plan of Care Review/Shift note should be completed every shift.  The Outcome Evaluation is a brief statement about your assessment that the patient is improving, declining, or no change.  This information will be displayed automatically on your shift  note.  Outcome: Adequate for Care Transition  Flowsheets (Taken 9/26/2024 1108)  Plan of Care Reviewed With:   patient   spouse  Overall Patient Progress: improving  Goal: Patient-Specific Goal (Individualized)  Description: You can add care plan individualizations to a care plan. Examples of Individualization might be:  \"Parent requests to be called daily at 9am for status\", \"I have a hard time hearing out of my right ear\", or \"Do not touch me to wake me up as it startles  me\".  Outcome: Adequate for Care Transition  Goal: Absence of Hospital-Acquired Illness or Injury  Outcome: Adequate for Care Transition  Intervention: Prevent Skin Injury  Recent Flowsheet Documentation  Taken 9/26/2024 0845 by Mena Bill RN  Body Position: position changed independently  Goal: Optimal Comfort and Wellbeing  Outcome: Adequate for Care Transition  Intervention: Monitor Pain and Promote Comfort  Recent Flowsheet Documentation  Taken 9/26/2024 0855 by Mena Bill RN  Pain Management Interventions: declines  Intervention: Provide Person-Centered Care  Recent Flowsheet Documentation  Taken 9/26/2024 0845 by Mena Bill RN  Trust Relationship/Rapport:   care explained   choices provided   emotional support provided   empathic listening provided   thoughts/feelings acknowledged  Goal: Readiness for Transition of Care  Outcome: Adequate for Care Transition   Goal Outcome Evaluation:      Plan of Care Reviewed With: patient, spouse    Overall Patient Progress: improvingOverall Patient Progress: improving     Charla is feeling well.  She has some uterine cramping especially while " breastfeeding as well as some perineal pain from laceration and repair.  She is using prn Ibuprofen and ice packs.  She is voiding spontaneously without difficulty.  Fundus is firm and lochia is light.  Vitals are stable.  She is eating and drinking well and she feels ready to discharge home today.  Her spouse Herve is in the room and attentive to her and Baby's needs.

## 2024-09-26 NOTE — PROGRESS NOTES
"Vaginal Delivery Postpartum Day 1    Patient Name:  Charla Cintron  :      1985  MRN:      [id number]      Assessment:  Normal postpartum course.    Plan:  Continue current care. Continue daily iron. Desires discharge after 24 hours if baby cleared for discharge.       Subjective:  The patient feels well:  Voiding without difficulty, lochia normal, tolerating normal diet, ambulating without difficulty and passing flatus. Voiding independently without complication. Pain is well controlled with current medications.  The patient has no emotional concerns.  The baby is well and being fed by breast.      YOB: 2022   Birth Time: 10:18 AM     Prenatal Complications include:   JANET from [facility] at 24 weeks  AMA (37)  Varicella non-immune   GBS positive     Objective:  /64 (BP Location: Left arm)   Pulse 78   Temp 97.7  F (36.5  C) (Oral)   Resp 18   Ht 1.549 m (5' 1\")   Wt 78.5 kg (173 lb)   SpO2 98%   Breastfeeding Unknown   BMI 32.69 kg/m    Patient Vitals for the past 24 hrs:   BP Temp Temp src Pulse Resp SpO2   24 0841 106/64 97.7  F (36.5  C) Oral 78 18 98 %   24 0405 97/54 97.9  F (36.6  C) Oral -- 16 97 %   24 2350 104/62 97.9  F (36.6  C) Oral -- 14 96 %   24 1945 92/54 98.1  F (36.7  C) Oral 68 18 96 %   24 1600 106/67 98.6  F (37  C) Oral -- 18 98 %   24 1245 113/62 98  F (36.7  C) Oral -- 18 --   24 1230 99/59 -- -- -- 18 --   24 1215 102/68 -- -- -- 18 --   24 1200 114/83 -- -- -- -- --   24 1145 117/79 -- -- -- -- --   24 1130 109/70 -- -- -- 18 --   24 1115 106/66 -- -- -- 18 --   24 1100 115/59 -- -- -- -- --   24 1045 101/58 -- -- -- -- --   24 1030 125/66 -- -- -- -- --       Exam: Patient A&O x 3. No acute distress, breathing unlabored.The amount and color of the lochia is appropriate for the duration of recovery. Nursing notes reviewed.     Lab Results "   Component Value Date    AS Negative 09/24/2024    HGB 10.3 (L) 09/26/2024       There is no immunization history for the selected administration types on file for this patient.    Provider:  DARYA Rubio CNM    Date:  9/26/2024  Time:  8:59 AM

## 2024-10-06 ENCOUNTER — HEALTH MAINTENANCE LETTER (OUTPATIENT)
Age: 39
End: 2024-10-06